# Patient Record
Sex: FEMALE | Race: WHITE | NOT HISPANIC OR LATINO | Employment: FULL TIME | URBAN - METROPOLITAN AREA
[De-identification: names, ages, dates, MRNs, and addresses within clinical notes are randomized per-mention and may not be internally consistent; named-entity substitution may affect disease eponyms.]

---

## 2024-08-25 ENCOUNTER — OFFICE VISIT (OUTPATIENT)
Dept: URGENT CARE | Facility: URGENT CARE | Age: 44
End: 2024-08-25
Payer: COMMERCIAL

## 2024-08-25 ENCOUNTER — ANCILLARY PROCEDURE (OUTPATIENT)
Dept: GENERAL RADIOLOGY | Facility: CLINIC | Age: 44
End: 2024-08-25
Attending: PHYSICIAN ASSISTANT
Payer: COMMERCIAL

## 2024-08-25 VITALS
HEART RATE: 103 BPM | OXYGEN SATURATION: 95 % | RESPIRATION RATE: 22 BRPM | SYSTOLIC BLOOD PRESSURE: 149 MMHG | DIASTOLIC BLOOD PRESSURE: 84 MMHG | TEMPERATURE: 97.9 F

## 2024-08-25 DIAGNOSIS — M25.562 ACUTE PAIN OF LEFT KNEE: ICD-10-CM

## 2024-08-25 DIAGNOSIS — M25.562 ACUTE PAIN OF LEFT KNEE: Primary | ICD-10-CM

## 2024-08-25 PROCEDURE — 73562 X-RAY EXAM OF KNEE 3: CPT | Mod: TC | Performed by: STUDENT IN AN ORGANIZED HEALTH CARE EDUCATION/TRAINING PROGRAM

## 2024-08-25 PROCEDURE — 99203 OFFICE O/P NEW LOW 30 MIN: CPT | Performed by: PHYSICIAN ASSISTANT

## 2024-08-25 RX ORDER — AMLODIPINE BESYLATE 10 MG/1
TABLET ORAL
COMMUNITY
Start: 2024-07-29

## 2024-08-25 RX ORDER — BUDESONIDE 180 UG/1
AEROSOL, POWDER RESPIRATORY (INHALATION)
COMMUNITY

## 2024-08-25 RX ORDER — LISINOPRIL AND HYDROCHLOROTHIAZIDE 20; 25 MG/1; MG/1
1 TABLET ORAL DAILY
COMMUNITY

## 2024-08-25 RX ORDER — ATORVASTATIN CALCIUM 40 MG/1
TABLET, FILM COATED ORAL
COMMUNITY
Start: 2024-06-03

## 2024-08-25 RX ORDER — MONTELUKAST SODIUM 10 MG/1
10 TABLET ORAL EVERY EVENING
COMMUNITY
Start: 2023-10-25

## 2024-08-25 RX ORDER — ALBUTEROL SULFATE 90 UG/1
AEROSOL, METERED RESPIRATORY (INHALATION)
COMMUNITY

## 2024-08-25 ASSESSMENT — ENCOUNTER SYMPTOMS
NAUSEA: 0
BACK PAIN: 0
ALLERGIC/IMMUNOLOGIC NEGATIVE: 1
DIARRHEA: 0
WOUND: 0
WEAKNESS: 0
ENDOCRINE NEGATIVE: 1
RHINORRHEA: 0
DIZZINESS: 0
RESPIRATORY NEGATIVE: 1
CHILLS: 0
CARDIOVASCULAR NEGATIVE: 1
MYALGIAS: 0
EYES NEGATIVE: 1
HEADACHES: 0
JOINT SWELLING: 0
NECK STIFFNESS: 0
COUGH: 0
FEVER: 0
PALPITATIONS: 0
SHORTNESS OF BREATH: 0
LIGHT-HEADEDNESS: 0
NECK PAIN: 0
SORE THROAT: 0
ARTHRALGIAS: 1
VOMITING: 0

## 2024-08-25 ASSESSMENT — PAIN SCALES - GENERAL: PAINLEVEL: EXTREME PAIN (8)

## 2024-08-25 NOTE — PROGRESS NOTES
Chief Complaint:     Chief Complaint   Patient presents with    Urgent Care    Musculoskeletal Problem     Per patient states while walking home in the parking lot she heard a pop sound on left knee since then has been having pain has done ice, heat, wrap, and advil but not helping       ASSESSMENT     1. Acute pain of left knee         PLAN    XR of the L knee was negative for any acute fracture per my read.  Unclear cause of her pain.    Patient can not bear weight.  She was given crutches and order placed for follow up with ortho.    NASEEM discussed  Recommended rest and avoidance of activities which cause pain or swelling.  Pain relief: Acetaminophen and or Ibuprofen with food.  Patient verbalized understanding, and agrees with this plan.    HPI: Miranda Escalante is an 44 year old female who presents today for evaluation of L knee injury.  Onset of the injury was 2 days ago when she felt a pop in the L knee while walking.  She has pain in the L knee now and is unable to bear weight.      Patient denies any numbness, tingling, or dysfunction of the L leg.    Patient is new to [facility].    ROS:      Review of Systems   Constitutional:  Negative for chills and fever.   HENT:  Negative for congestion, ear pain, rhinorrhea and sore throat.    Eyes: Negative.    Respiratory: Negative.  Negative for cough and shortness of breath.    Cardiovascular: Negative.  Negative for chest pain and palpitations.   Gastrointestinal:  Negative for diarrhea, nausea and vomiting.   Endocrine: Negative.    Genitourinary: Negative.    Musculoskeletal:  Positive for arthralgias. Negative for back pain, joint swelling, myalgias, neck pain and neck stiffness.   Skin: Negative.  Negative for rash and wound.   Allergic/Immunologic: Negative.  Negative for immunocompromised state.   Neurological:  Negative for dizziness, weakness, light-headedness and headaches.        Problem history  There is no problem list on file for this  patient.       Allergies  Allergies   Allergen Reactions    Dust Mite Extract Other (See Comments)    Metaproterenol Anaphylaxis     As a very young child    Peanut-Containing Drug Products Anaphylaxis    Pollen Extract Other (See Comments)     (Tree)    Tetracycline Other (See Comments)        Smoking History  History   Smoking Status    Never   Smokeless Tobacco    Never        Current Meds    Current Outpatient Medications:     amLODIPine (NORVASC) 10 MG tablet, TAKE 1 TABLET(10 MG) BY MOUTH DAILY FOR HIGH BLOOD PRESSURE, Disp: , Rfl:     atorvastatin (LIPITOR) 40 MG tablet, TAKE 1/2 TABLET(20 MG) BY MOUTH AT BEDTIME, Disp: , Rfl:     lisinopril-hydrochlorothiazide (ZESTORETIC) 20-25 MG tablet, Take 1 tablet by mouth daily. for high blood pressure, Disp: , Rfl:     montelukast (SINGULAIR) 10 MG tablet, Take 10 mg by mouth every evening., Disp: , Rfl:     PULMICORT FLEXHALER 180 MCG/ACT inhaler, INHALE 1 PUFF TWICE A DAY. DO NOT SHAKE BEFORE USE. AFTER INHALATION RINSE MOUTH WITH WATER AND SPIT, Disp: , Rfl:     VENTOLIN  (90 Base) MCG/ACT inhaler, INHALE 1 TO 2 PUFFS BY MOUTH EVERY 4 HOURS AS NEEDED FOR WHEEZING OR SHORTNESS OF BREATH OR ASTHMA, Disp: , Rfl:         Vital signs reviewed by Pierre Gregorio PA-C  BP (!) 149/84   Pulse 103   Temp 97.9  F (36.6  C) (Tympanic)   Resp 22   LMP 07/17/2024   SpO2 95%     Physical Exam     Physical Exam  Vitals and nursing note reviewed.   Constitutional:       General: She is not in acute distress.     Appearance: She is well-developed. She is not ill-appearing, toxic-appearing or diaphoretic.   HENT:      Head: Normocephalic and atraumatic.      Right Ear: Tympanic membrane and external ear normal. No drainage, swelling or tenderness. Tympanic membrane is not perforated, erythematous, retracted or bulging.      Left Ear: Tympanic membrane and external ear normal. No drainage, swelling or tenderness. Tympanic membrane is not perforated, erythematous,  retracted or bulging.      Nose: No mucosal edema, congestion or rhinorrhea.      Right Sinus: No maxillary sinus tenderness or frontal sinus tenderness.      Left Sinus: No maxillary sinus tenderness or frontal sinus tenderness.      Mouth/Throat:      Pharynx: No pharyngeal swelling, oropharyngeal exudate, posterior oropharyngeal erythema or uvula swelling.      Tonsils: No tonsillar abscesses.   Eyes:      Pupils: Pupils are equal, round, and reactive to light.   Neck:      Trachea: Trachea normal.   Cardiovascular:      Rate and Rhythm: Normal rate and regular rhythm.      Heart sounds: Normal heart sounds, S1 normal and S2 normal. No murmur heard.     No friction rub. No gallop.   Pulmonary:      Effort: Pulmonary effort is normal. No respiratory distress.      Breath sounds: Normal breath sounds. No decreased breath sounds, wheezing, rhonchi or rales.   Abdominal:      General: Bowel sounds are normal. There is no distension.      Palpations: Abdomen is soft. Abdomen is not rigid. There is no mass.      Tenderness: There is no abdominal tenderness. There is no guarding or rebound.   Musculoskeletal:      Cervical back: Full passive range of motion without pain, normal range of motion and neck supple.      Left knee: Swelling present. Decreased range of motion.   Lymphadenopathy:      Cervical: No cervical adenopathy.   Skin:     General: Skin is warm and dry.   Neurological:      Mental Status: She is alert and oriented to person, place, and time.      Cranial Nerves: No cranial nerve deficit.      Deep Tendon Reflexes: Reflexes are normal and symmetric.   Psychiatric:         Behavior: Behavior normal. Behavior is cooperative.         Thought Content: Thought content normal.         Judgment: Judgment normal.             Pierre Gregorio PA-C  8/25/2024, 12:10 PM

## 2024-09-13 NOTE — TELEPHONE ENCOUNTER
DIAGNOSIS: Acute pain of left knee DOI 8/23, Pierre Gregorio PA-C, xray 8/25     APPOINTMENT DATE: 9.16.24   NOTES STATUS DETAILS   DISCHARGE REPORT from the ER Internal 8.25.24  Jg     MEDICATION LIST Internal    XRAYS (IMAGES & REPORTS) Internal 8.25.24  XR Knee Left

## 2024-09-16 ENCOUNTER — OFFICE VISIT (OUTPATIENT)
Dept: ORTHOPEDICS | Facility: CLINIC | Age: 44
End: 2024-09-16
Attending: PHYSICIAN ASSISTANT
Payer: COMMERCIAL

## 2024-09-16 ENCOUNTER — PRE VISIT (OUTPATIENT)
Dept: ORTHOPEDICS | Facility: CLINIC | Age: 44
End: 2024-09-16

## 2024-09-16 DIAGNOSIS — M25.562 ACUTE PAIN OF LEFT KNEE: ICD-10-CM

## 2024-09-16 PROCEDURE — 99204 OFFICE O/P NEW MOD 45 MIN: CPT | Performed by: FAMILY MEDICINE

## 2024-09-16 NOTE — PROGRESS NOTES
[facility] AND SUR[facility]ERY CENTER  SPORTS & ORTHOPEDIC CLINIC VISIT     Sep 16, 2024        ASSESSMENT & PLAN    44-year-old with acute medial left knee pain concerning for meniscal injury    Reviewed imaging and assessment with patient in detail    -Continue to use crutches if you feel the need to do so to get around comfortably  -Continuing to wrap the knee with an Ace wrap or simple brace for comfort is also an option  -Naproxen 1 to 2 tablets twice daily on an as-needed basis is okay for pain  -Begin home exercise regimen to the extent that you can do so comfortably.  Follow-up with physical therapy  -If you are not experiencing any improvement or unable to ambulate without crutches in the next 3 to 4 weeks contact our office and we would consider an MRI at that time    Sean Mckeon MD  [facility] SPORTS MEDICINE CLINIC [place]    -----  Chief Complaint   Patient presents with    Left Knee - Pain       SUBJECTIVE  Miranda Escalante is a/an 44 year old female who is seen as an  referral for evaluation of  left knee pain.     The patient is seen by themselves.  The patient is Right handed    Onset: 8/23/24. Patient describes injury as coming home from work and went to get out of car and felt a pop.   Location of Pain: left medial and posterior knee  Worsened by: Walking, bending, weightbearing   Better with: Aleve, crutches and ace wrap   Treatments tried: Aleve, crutches, ace wrap   Associated symptoms: swelling and weakness     Orthopedic/Surgical history: NO  Social History/Occupation: Teacher       REVIEW OF SYSTEMS:  Do you have fever, chills, weight loss? No  Do you have any vision problems? No  Do you have any chest pain or edema? No  Do you have any shortness of breath or wheezing?  No  Do you have stomach problems? No  Do you have any numbness or focal weakness? No  Do you have diabetes? No  Do you have problems with bleeding or clotting? No  Do you have an rashes or other skin lesions?  No    OBJECTIVE:  [facility] 07/17/2024      Patient is alert, No acute distress, pleasant and conversational.    Gait: crutches    left knee:   Skin intact. No erythema or ecchymosis.  No effusion or soft tissue swelling.    AROM: Zero to approximately 135  with pain on terminal flexion. Crepitus in anterior knee    Palpation: No medial or lateral facet joint tenderness.  TTP posterior medial joint line    Special Tests:  Negative bounce test, negative forced flexion and + Nora's.  No ligamentous laxity or pain with valgus or varus stress.  Negative Lachman's, Anterior Drawer and Posterior Drawer     Full Isometric quad strength, extensor mechanism in place     Neurovascularly intact in the lower extremity      RADIOLOGY:    3 view xrays of left knee performed 8/25/24 and reviewed independently demonstrating no acute fracture or dislocation. No djd. See EMR for formal radiology report.

## 2024-09-16 NOTE — PATIENT INSTRUCTIONS
We suspect that you may have had an injury to the meniscus in your knee  -Continue to use crutches if you feel the need to do so to get around comfortably  -Continuing to wrap the knee with an Ace wrap or simple brace for comfort is also an option  -Naproxen 1 to 2 tablets twice daily on an as-needed basis is okay for pain  -Begin home exercise regimen to the extent that you can do so comfortably.  Follow-up with physical therapy  -If you are not experiencing any improvement or unable to ambulate without crutches in the next 3 to 4 weeks contact our office and we would consider an MRI at that time    KEY FACTS ABOUT MENISCAL TEARS  Click to enlarge A meniscal tear can be caused by a sudden activity that twists or tears a ligament, such as a forced twisting motion of your knee, or a fall or impact during football, basketball, or soccer.    Straightening your knee further than it can normally be straightened (hyperextension) or trouble bending, like when you land from a jump, is also another cause of a meniscal tear.  A loud, painful pop at the time of the injury is very common. It causes an immediate swelling and pain around the knee as if loose or unstable.    WHAT IS MENISCAL TEAR?  A meniscal tear is tear to the meniscal ligament that keeps the knee from bending inward. The meniscal, along with other ligaments, keeps your knee and leg bones in place when you walk or run. When a ligament is injured, it can be stretched, partially torn, or completely torn. Complete tears make the knee joint very loose and unstable.    A ligament injury is also called a sprain.    HOW IS MENISCAL TEAR DIAGNOSED AND TREATED?  A physician will examine the knee, ligaments, and tissue to make an initial assessment. Typically, he or she will recommend a combination of X-rays, CT scans, or MRIs to determine the exact extent of the damage.     You will need to change or stop doing the activities that cause pain until the ligament has  healed.    If you have swelling in your joint, your healthcare team may need to remove fluid from your knee with a needle and syringe.  Your care team may wrap an elastic bandage around your knee to keep the swelling from getting worse. You may need to keep your knee in a knee immobilizer and use crutches to protect your knee while you heal.  For complete tears, you and your healthcare team will decide if you should have intense rehabilitation therapy or if you should have surgery followed by rehab. A torn meniscal cannot be sewn back together. The ligament must be surgically reconstructed by taking ligaments or tendons from another part of your leg and connecting them to the thighbone and shinbone.  If you have a completely torn mensical and it is not repaired with surgery, the effects will be life-long. Your knee may feel loose and feel like it will give way when you are running and making quick turns. Rehabilitation exercises and a special brace will help improve these symptoms. If you can do your normal activities without pain and are willing to give up activities that put extra stress on your knee, you may not need surgery.    You may consider having reconstructive meniscal tear surgery if:  Your knee is unstable and gives out during routine or athletic activity.  You are a  and your knee could be unstable and give out during your sport (for example, basketball, football, or soccer).  You are not willing to give up sports that involve pivoting and cutting, like soccer and basketball.  You want to prevent further injury to your knee. An unstable knee may lead to more injuries and arthritis.    HOW CAN I MANAGE MENISCAL TEAR?  Follow your healthcare team's instructions, including any recommended physical therapy or exercises.  To keep swelling down and help relieve pain for the first few days after the injury:  Put an ice pack, gel pack, or package of frozen vegetables wrapped in a cloth on the  injured area every 3 to 4 hours for up to 20 minutes at a time.  Keep your knee up on a pillow when you sit or lie down.  Take nonprescription pain medicine, such as acetaminophen, ibuprofen, or naproxen. Read the label and take as directed. Unless recommended by your healthcare team, you should not take this medicine for more than 10 days.    Knee Exercises (Meniscal tear)    You may do the first 7 exercises right away. You may do the rest of the exercises when the pain in your knee has decreased.    Passive knee extension: Do this exercise if you are unable to extend your knee fully. While lying on your back, place a rolled-up towel under the heel of your injured leg so the heel is about 6 inches off the ground. Relax your leg muscles and let gravity slowly straighten your knee. Try to hold this position for 2 minutes. Repeat 3 times. You may feel some discomfort while doing this exercise. Do the exercise several times a day.  This exercise can also be done while sitting in a chair with your heel on another chair or stool.    Heel slide: Sit on a firm surface with your legs straight in front of you. Slowly slide the heel of the foot on your injured side toward your buttock by pulling your knee toward your chest as you slide the heel. Return to the starting position. Do 2 sets of 15.  Standing calf stretch: Stand facing a wall with your hands on the wall at about eye level. Keep your injured leg back with your heel on the floor. Keep the other leg forward with the knee bent. Turn your back foot slightly inward (as if you were pigeon-toed). Slowly lean into the wall until you feel a stretch in the back of your calf. Hold the stretch for 15 to 30 seconds. Return to the starting position. Repeat 3 times. Do this exercise several times each day.    Hamstring stretch on wall: Lie on your back with your buttocks close to a doorway. Stretch your uninjured leg straight out in front of you on the floor through the doorway.  Raise your injured leg and rest it against the wall next to the door frame. Keep your leg as straight as possible. You should feel a stretch in the back of your thigh. Hold this position for 15 to 30 seconds. Repeat 3 times.  Straight leg raise: Lie on your back with your legs straight out in front of you. Bend the knee on your uninjured side and place the foot flat on the floor. Tighten the thigh muscle on your injured side and lift your leg about 8 inches off the floor. Keep your leg straight and your thigh muscle tight. Slowly lower your leg back down to the floor. Do 2 sets of 15.    Prone hip extension: Lie on your stomach with your legs straight out behind you. Fold your arms under your head and rest your head on your arms. Draw your belly button in towards your spine and tighten your abdominal muscles. Tighten the buttocks and thigh muscles of the leg on your injured side and lift the leg off the floor about 8 inches. Keep your leg straight. Hold for 5 seconds. Then lower your leg and relax. Do 2 sets of 15.  Clam exercise: Lie on your uninjured side with your hips and knees bent and feet together. Slowly raise your top leg toward the ceiling while keeping your heels touching each other. Hold for 2 seconds and lower slowly. Do 2 sets of 15 repetitions.    Wall squat with a ball: Stand with your back, shoulders, and head against a wall. Look straight ahead. Keep your shoulders relaxed and your feet 3 feet (90 centimeters) from the wall and shoulder's width apart. Place a soccer or basketball-sized ball behind your back. Keeping your back against the wall, slowly squat down to a 45-degree angle. Your thighs will not yet be parallel to the floor. Hold this position for 10 seconds and then slowly slide back up the wall. Repeat 10 times. Build up to 2 sets of 15.  Step-up: Stand with the foot of your injured leg on a support 3 to 5 inches (8 to 13 centimeters) high --like a small step or block of wood. Keep your  other foot flat on the floor. Shift your weight onto the injured leg on the support. Straighten your injured leg as the other leg comes off the floor. Return to the starting position by bending your injured leg and slowly lowering your uninjured leg back to the floor. Do 2 sets of 15.    Knee stabilization: Wrap a piece of elastic tubing around the ankle of your uninjured leg. Tie a knot in the other end of the tubing and close it in a door at about ankle height.  Stand facing the door on the leg without tubing (your injured leg) and bend your knee slightly, keeping your thigh muscles tight. Stay in this position while you move the leg with the tubing (the uninjured leg) straight back behind you. Do 2 sets of 15.  Turn 90 degrees so the leg without tubing is closest to the door. Move the leg with tubing away from your body. Do 2 sets of 15.  Turn 90 degrees again so your back is to the door. Move the leg with tubing straight out in front of you. Do 2 sets of 15.  Turn your body 90 degrees again so the leg with tubing is closest to the door. Move the leg with tubing across your body. Do 2 sets of 15.  Hold onto a chair if you need help balancing. This exercise can be made more challenging by standing on a firm pillow or foam mat while you move the leg with tubing.    Resisted terminal knee extension: Make a loop with a piece of elastic tubing by tying a knot in both ends. Close the knot in a door at knee height. Step into the loop with your injured leg so the tubing is around the back of your knee. Lift the other foot off the ground and hold onto a chair for balance, if needed. Bend the knee with tubing about 45 degrees. Slowly straighten your leg, keeping your thigh muscle tight as you do this. Repeat 15 times. Do 2 sets of 15. If you need an easier way to do this, stand on both legs for better support while you do the exercise.  If you have access to a wobble board, do the following exercises:            Developed  by [employer].  Published by [employer].  Copyright  2014 [employer] and/or one of its subsidiaries. All rights reserved.

## 2024-09-16 NOTE — LETTER
9/16/2024      RE: Miranda Escalante  [address]  [place] [address]     Dear Colleague,    Thank you for referring your patient, Miranda Escalante, to the [facility] SPORTS MEDICINE [facility]. Please see a copy of my visit note below.      [facility] [place] AND SURGERY CENTER  SPORTS & ORTHOPEDIC CLINIC VISIT     Sep 16, 2024        ASSESSMENT & PLAN    44-year-old with acute medial left knee pain concerning for meniscal injury    Reviewed imaging and assessment with patient in detail    -Continue to use crutches if you feel the need to do so to get around comfortably  -Continuing to wrap the knee with an Ace wrap or simple brace for comfort is also an option  -Naproxen 1 to 2 tablets twice daily on an as-needed basis is okay for pain  -Begin home exercise regimen to the extent that you can do so comfortably.  Follow-up with physical therapy  -If you are not experiencing any improvement or unable to ambulate without crutches in the next 3 to 4 weeks contact our office and we would consider an MRI at that time    Sean Mckeon MD  [facility] SPORTS [facility]    -----  Chief Complaint   Patient presents with     Left Knee - Pain       SUBJECTIVE  Miranda Escalante is a/an 44 year old female who is seen as an UC referral for evaluation of  left knee pain.     The patient is seen by themselves.  The patient is Right handed    Onset: 8/23/24. Patient describes injury as coming home from work and went to get out of car and felt a pop.   Location of Pain: left medial and posterior knee  Worsened by: Walking, bending, weightbearing   Better with: Aleve, crutches and ace wrap   Treatments tried: Aleve, crutches, ace wrap   Associated symptoms: swelling and weakness     Orthopedic/Surgical history: NO  Social History/Occupation: Teacher       REVIEW OF SYSTEMS:  Do you have fever, chills, weight loss? No  Do you have any vision problems? No  Do you have any chest  pain or edema? No  Do you have any shortness of breath or wheezing?  No  Do you have stomach problems? No  Do you have any numbness or focal weakness? No  Do you have diabetes? No  Do you have problems with bleeding or clotting? No  Do you have an rashes or other skin lesions? No    OBJECTIVE:  [facility] 07/17/2024      Patient is alert, No acute distress, pleasant and conversational.    Gait: crutches    left knee:   Skin intact. No erythema or ecchymosis.  No effusion or soft tissue swelling.    AROM: Zero to approximately 135  with pain on terminal flexion. Crepitus in anterior knee    Palpation: No medial or lateral facet joint tenderness.  TTP posterior medial joint line    Special Tests:  Negative bounce test, negative forced flexion and + Nora's.  No ligamentous laxity or pain with valgus or varus stress.  Negative Lachman's, Anterior Drawer and Posterior Drawer     Full Isometric quad strength, extensor mechanism in place     Neurovascularly intact in the lower extremity      RADIOLOGY:    3 view xrays of left knee performed 8/25/24 and reviewed independently demonstrating no acute fracture or dislocation. No djd. See EMR for formal radiology report.                Again, thank you for allowing me to participate in the care of your patient.      Sincerely,    Sean Mckeon MD

## 2024-09-17 ENCOUNTER — TELEPHONE (OUTPATIENT)
Dept: ORTHOPEDICS | Facility: CLINIC | Age: 44
End: 2024-09-17
Payer: COMMERCIAL

## 2024-09-17 NOTE — TELEPHONE ENCOUNTER
Left Voicemail (1st Attempt) for the patient to call back and schedule the following:    Appointment type: RETURN KNEE  Provider: Dr. Mckeon  Return date: 10/16/24

## 2024-09-19 ENCOUNTER — TELEPHONE (OUTPATIENT)
Dept: ORTHOPEDICS | Facility: CLINIC | Age: 44
End: 2024-09-19
Payer: COMMERCIAL

## 2024-09-19 NOTE — TELEPHONE ENCOUNTER
Patient confirmed scheduled appointment:  Date: 10/16/24  Time: 1:00pm  Visit type: Return Knee  Provider:   Location: [facility]

## 2024-10-21 ENCOUNTER — OFFICE VISIT (OUTPATIENT)
Dept: ORTHOPEDICS | Facility: CLINIC | Age: 44
End: 2024-10-21
Attending: FAMILY MEDICINE
Payer: COMMERCIAL

## 2024-10-21 DIAGNOSIS — M25.562 ACUTE PAIN OF LEFT KNEE: Primary | ICD-10-CM

## 2024-10-21 PROCEDURE — 99213 OFFICE O/P EST LOW 20 MIN: CPT | Performed by: FAMILY MEDICINE

## 2024-10-21 NOTE — LETTER
10/21/2024      RE: Miranda Escalante  [address]  [facility] [address]     Dear Colleague,    Thank you for referring your patient, Miranda Escalante, to the [facility]. Please see a copy of my visit note below.      [facility]  SPORTS & ORTHOPEDIC CLINIC VISIT     Oct 21, 2024        ASSESSMENT & PLAN    44-year-old with left knee pain for the past 2 months though has improved considerably over the last 4 weeks with course of NSAID, ice, activity modification, and home exercise regimen.  Less concern for meniscal injury and more likely patellofemoral at this time.    Reviewed imaging and assessment with patient in detail  Would continue with home exercises and expect continued improvement.  Okay to gradually return to regular activities.  Follow-up as needed if improvement stalls or she has setbacks or recurrences.  May consider further imaging at that time.    Sean Mckeon MD  [facility]    -----  Chief Complaint   Patient presents with     Left Knee - Pain       SUBJECTIVE  Miranda Escalante is a/an 44 year old female who is seen for follow up of left knee pain concern for mensicus injury. Doing much better today and feels like it improving each week.    The patient is seen by themselves.    Date of injury: 8/23/24  Date of Last Visit: 9/16/24   Symptoms: improved, roughly 75% better  Worsened by: Too long without aleve, or too much activity   Better with: Aleve once a day, HEP  Treatment to date: Aleve and home exercises  Associated symptoms: no distal numbness or tingling; denies swelling or warmth        REVIEW OF SYSTEMS:  See HPI     OBJECTIVE:  LMP 07/17/2024      Patient is alert, No acute distress, pleasant and conversational.    Gait: nonantalgic. Normal heel toe gait.    Patient is able to perform two legged squat without difficulty.    left knee:   Skin intact. No erythema or  ecchymosis.  No effusion or soft tissue swelling.    AROM: Zero to approximately 135  without restriction or reported pain. Continued crepitus in anterior knee    Palpation: No medial or lateral facet joint tenderness.  No posterior medial or posterior lateral joint line tenderness     Special Tests:  Negative bounce test, negative forced flexion and negative Nora's.    Full Isometric quad strength, extensor mechanism in place     Neurovascularly intact in the lower extremity        RADIOLOGY:    No imaging this visit        Again, thank you for allowing me to participate in the care of your patient.      Sincerely,    Sean Mckeon MD

## 2024-10-21 NOTE — PROGRESS NOTES
[facility]  SPORTS & ORTHOPEDIC CLINIC VISIT     Oct 21, 2024        ASSESSMENT & PLAN    44-year-old with left knee pain for the past 2 months though has improved considerably over the last 4 weeks with course of NSAID, ice, activity modification, and home exercise regimen.  Less concern for meniscal injury and more likely patellofemoral at this time.    Reviewed imaging and assessment with patient in detail  Would continue with home exercises and expect continued improvement.  Okay to gradually return to regular activities.  Follow-up as needed if improvement stalls or she has setbacks or recurrences.  May consider further imaging at that time.    Sean Mckeon MD  [facility] [place]    -----  Chief Complaint   Patient presents with    Left Knee - Pain       SUBJECTIVE  Miranda Escalante is a/an 44 year old female who is seen for follow up of left knee pain concern for mensicus injury. Doing much better today and feels like it improving each week.    The patient is seen by themselves.    Date of injury: 8/23/24  Date of Last Visit: 9/16/24   Symptoms: improved, roughly 75% better  Worsened by: Too long without aleve, or too much activity   Better with: Aleve once a day, HEP  Treatment to date: Aleve and home exercises  Associated symptoms: no distal numbness or tingling; denies swelling or warmth        REVIEW OF SYSTEMS:  See HPI     OBJECTIVE:  LMP 07/17/2024      Patient is alert, No acute distress, pleasant and conversational.    Gait: nonantalgic. Normal heel toe gait.    Patient is able to perform two legged squat without difficulty.    left knee:   Skin intact. No erythema or ecchymosis.  No effusion or soft tissue swelling.    AROM: Zero to approximately 135  without restriction or reported pain. Continued crepitus in anterior knee    Palpation: No medial or lateral facet joint tenderness.  No posterior medial or posterior lateral joint line  tenderness     Special Tests:  Negative bounce test, negative forced flexion and negative Nora's.    Full Isometric quad strength, extensor mechanism in place     Neurovascularly intact in the lower extremity        RADIOLOGY:    No imaging this visit